# Patient Record
Sex: FEMALE | Race: BLACK OR AFRICAN AMERICAN | Employment: FULL TIME | URBAN - METROPOLITAN AREA
[De-identification: names, ages, dates, MRNs, and addresses within clinical notes are randomized per-mention and may not be internally consistent; named-entity substitution may affect disease eponyms.]

---

## 2023-01-18 ENCOUNTER — APPOINTMENT (OUTPATIENT)
Dept: GENERAL RADIOLOGY | Age: 28
End: 2023-01-18
Payer: MEDICAID

## 2023-01-18 ENCOUNTER — HOSPITAL ENCOUNTER (EMERGENCY)
Age: 28
Discharge: HOME | End: 2023-01-18
Attending: EMERGENCY MEDICINE
Payer: MEDICAID

## 2023-01-18 VITALS
TEMPERATURE: 98.9 F | WEIGHT: 206.79 LBS | HEIGHT: 61 IN | HEART RATE: 84 BPM | SYSTOLIC BLOOD PRESSURE: 111 MMHG | DIASTOLIC BLOOD PRESSURE: 80 MMHG | RESPIRATION RATE: 16 BRPM | BODY MASS INDEX: 39.04 KG/M2 | OXYGEN SATURATION: 100 %

## 2023-01-18 DIAGNOSIS — R07.89 CHEST WALL PAIN: Primary | ICD-10-CM

## 2023-01-18 PROCEDURE — 93005 ELECTROCARDIOGRAM TRACING: CPT | Performed by: EMERGENCY MEDICINE

## 2023-01-18 PROCEDURE — 71046 X-RAY EXAM CHEST 2 VIEWS: CPT

## 2023-01-18 PROCEDURE — 6370000000 HC RX 637 (ALT 250 FOR IP): Performed by: EMERGENCY MEDICINE

## 2023-01-18 PROCEDURE — 99284 EMERGENCY DEPT VISIT MOD MDM: CPT

## 2023-01-18 RX ORDER — CYCLOBENZAPRINE HCL 10 MG
10 TABLET ORAL ONCE
Status: COMPLETED | OUTPATIENT
Start: 2023-01-18 | End: 2023-01-18

## 2023-01-18 RX ORDER — KETOROLAC TROMETHAMINE 15 MG/ML
15 INJECTION, SOLUTION INTRAMUSCULAR; INTRAVENOUS ONCE
Status: DISCONTINUED | OUTPATIENT
Start: 2023-01-18 | End: 2023-01-19 | Stop reason: HOSPADM

## 2023-01-18 RX ORDER — NAPROXEN 500 MG/1
500 TABLET ORAL 2 TIMES DAILY WITH MEALS
Qty: 60 TABLET | Refills: 5 | Status: SHIPPED | OUTPATIENT
Start: 2023-01-18

## 2023-01-18 RX ORDER — TIZANIDINE 4 MG/1
4 TABLET ORAL EVERY 6 HOURS PRN
Qty: 20 TABLET | Refills: 0 | Status: SHIPPED | OUTPATIENT
Start: 2023-01-18

## 2023-01-18 RX ADMIN — CYCLOBENZAPRINE 10 MG: 10 TABLET, FILM COATED ORAL at 20:35

## 2023-01-18 ASSESSMENT — PAIN SCALES - GENERAL: PAINLEVEL_OUTOF10: 8

## 2023-01-19 LAB
EKG ATRIAL RATE: 84 BPM
EKG DIAGNOSIS: NORMAL
EKG P AXIS: 47 DEGREES
EKG P-R INTERVAL: 150 MS
EKG Q-T INTERVAL: 368 MS
EKG QRS DURATION: 82 MS
EKG QTC CALCULATION (BAZETT): 434 MS
EKG R AXIS: 32 DEGREES
EKG T AXIS: 37 DEGREES
EKG VENTRICULAR RATE: 84 BPM

## 2023-01-19 NOTE — ED NOTES
Patient declines toradol at this time, wants to try PO flexeril first. If flexeril doesn't work would like the Dejon Smith RN  01/18/23 2037       Ingrid Head RN  01/18/23 8047

## 2023-01-20 NOTE — ED PROVIDER NOTES
[facility] ENCOUNTER        Pt Name: Francesca Duran  MRN: [id number]  [place] 1995  Date of evaluation: 1/18/2023  Provider: Stefano Garrido MD  PCP: No primary care provider on file. Note Started: 4:29 AM EST 1/20/23    CHIEF COMPLAINT       Chief Complaint   Patient presents with    Nasal Congestion     X2days, with chest congestion sob starting today. HISTORY OF PRESENT ILLNESS: 1 or more Elements   History From: Patient        Francesca Duran is a 33 y.o. female who presents for evaluation of left-sided chest wall pain. Patient reports that 2-day history of symptoms. Reports having severity congestion. Denies difficulties breathing or fevers. She does not take medications for symptoms. Reports that pain is worse with positioning and movement. States it is worse when she sits and improves and she stands. Denies past 1 to lower extremities. Denies recent time spent immobilized. Denies a history of PE or DVT. Denies a history of cardiopulmonary disease. Nursing Notes were all reviewed and agreed with or any disagreements were addressed in the HPI. REVIEW OF SYSTEMS :      Review of Systems    Positives and Pertinent negatives as per HPI. SURGICAL HISTORY   History reviewed. No pertinent surgical history. [address]       Discharge Medication List as of 1/18/2023  9:50 PM        CONTINUE these medications which have NOT CHANGED    Details   miconazole (MONISTAT 1 COMBINATION PACK) 200 & 2 MG-% (9GM) KIT kit Place vaginally nightly, Vaginal, NIGHTLY Starting Wed 1/3/2018, Disp-7 each, R-0, Print             ALLERGIES     Latex    FAMILYHISTORY     History reviewed. No pertinent family history.      SOCIAL HISTORY       Social History     Tobacco Use    Smoking status: Every Day     Packs/day: 1.00     Types: Cigars, Cigarettes     Start date: 3/30/2010   Substance Use Topics    Alcohol use: No     Alcohol/week: 0.0 standard drinks    Drug use: No       SCREENINGS        Pierson Coma Scale  Eye Opening: Spontaneous  Best Verbal Response: Oriented  Best Motor Response: Obeys commands  Carl Coma Scale Score: 15                CIWA Assessment  BP: 111/80  Heart Rate: 84           PHYSICAL EXAM  1 or more Elements     ED Triage Vitals [01/18/23 2011]   BP Temp Temp Source Heart Rate Resp SpO2 Height Weight   111/80 98.9 °F (37.2 °C) Oral 97 16 98 % 5' 1\" (1.549 m) 206 lb 12.7 oz (93.8 kg)       Physical Exam  HENT:      Mouth/Throat:      Mouth: Mucous membranes are moist.      Pharynx: Oropharynx is clear. No oropharyngeal exudate. Cardiovascular:      Rate and Rhythm: Normal rate. Pulmonary:      Breath sounds: No stridor. No wheezing or rhonchi. Abdominal:      Tenderness: There is no abdominal tenderness. There is no guarding. Skin:     General: Skin is warm. Capillary Refill: Capillary refill takes less than 2 seconds. DIAGNOSTIC RESULTS   LABS:    Labs Reviewed - No data to display    When ordered only abnormal lab results are displayed. All other labs were within normal range or not returned as of this dictation. EKG: EKG emergency sinus rhythm ventricular rate of 84 bpm.  WV interval and QTc interval within normal limits. Patient is normal axis. There are no significant ST elevations or depressions EKG is nondiagnostic for ACS as interpreted by me. .  Previous EKG to compare to    RADIOLOGY:   Non-plain film images such as CT, Ultrasound and MRI are read by the radiologist. Plain radiographic images are visualized and preliminarily interpreted by the ED Provider with the below findings:    Do not appreciate any acute cardiopulmonary disease. No obvious abnormality osseous structures as interpreted by me. Interpretation per the Radiologist below, if available at the time of this note:    Discussed with Radiologist:     XR CHEST (2 VW)   Final Result   No acute abnormality identified. XR CHEST (2 VW)    Result Date: 1/18/2023  EXAMINATION: TWO XRAY VIEWS OF THE CHEST 1/18/2023 5:49 pm COMPARISON: 01/03/2018 HISTORY: ORDERING SYSTEM PROVIDED HISTORY: chest wall pain TECHNOLOGIST PROVIDED HISTORY: Reason for exam:->chest wall pain Reason for Exam: Chest wall pain Additional signs and symptoms: Nasal congestion - X2days, with chest congestion sob starting today. Relevant Medical/Surgical History: Current every day smoker. Hx of obesity. No hx of any other relevant medical issues or of any relevant surgeries. FINDINGS: The cardial-pericardial silhouette is unremarkable in appearance. The lungs are clear. No pneumothorax is found. No free air is seen. No acute bony abnormality. No acute abnormality identified. No results found. PROCEDURES   Unless otherwise noted below, none     Procedures    CRITICAL CARE TIME (.cct)       PAST MEDICAL HISTORY      has no past medical history on file. EMERGENCY DEPARTMENT COURSE and DIFFERENTIAL DIAGNOSIS/MDM:   Vitals:    Vitals:    01/18/23 2011 01/18/23 2208   BP: 111/80    Pulse: 97 84   Resp: 16 16   Temp: 98.9 °F (37.2 °C)    TempSrc: Oral    SpO2: 98% 100%   Weight: 206 lb 12.7 oz (93.8 kg)    Height: 5' 1\" (1.549 m)        Patient was given the following medications:  Medications   cyclobenzaprine (FLEXERIL) tablet 10 mg (10 mg Oral Given 1/18/23 2035)             Is this patient to be included in the SEP-1 Core Measure due to severe sepsis or septic shock? No   Exclusion criteria - the patient is NOT to be included for SEP-1 Core Measure due to:  2+ SIRS criteria are not met    CC/HPI Summary, DDx, ED Course, and Reassessment: Patient resents ED for valuation of chest pain. It is positional in nature and patient reports its worse when tries to raise her arms or sit down flat. Lungs consultation vital signs within normal limits. Patient low risk for CAD based on age and comorbidities.   Low risk for PE based on PERC and Wells criteria. CONSULTS: (Who and What was discussed)  None  \        Chronic Conditions: History reviewed. No pertinent past medical history. Records Reviewed (source and summary):    Disposition Considerations (include 1 Tests not done, Admit vs D/C, Shared Decision Making, Pt Expectation of Test or Tx.): Considered obtaining additional laboratory work-up of the patient is low risk for CAD or ACS. Based on history and presentation strongly suspect a musculoskeletal etiology of patient's symptoms. X-ray without acute Cardiopulmonary disease. Results cussed with patient as well as my suspicion that her symptoms are musculoskeletal in nature. Admission to the hospital considered but patient's symptoms are improving. Vital signs and testing performed is reassuring. Based on this patient is appropriate for outpatient management. No indication for admission at this time. Symptomatic treatment with expectant management discussed with the patient and/or family member or surrogates present and they are amenable to treatment plan and outpatient follow-up. Strict return precautions were discussed with the patient and those present. All parties involved were informed that condition may persist or worsen in which case they may then require inpatient treatment, currently there is no indication. They demonstrated understanding of when to return to the emergency department for new or worsening symptoms. I am the Primary Clinician of Record. FINAL IMPRESSION      1.  Chest wall pain          DISPOSITION/PLAN     DISPOSITION Decision To Discharge 01/18/2023 09:46:15 PM      PATIENT REFERRED TO:  [facility]) Pre-Services  [phone]          DISCHARGE MEDICATIONS:  Discharge Medication List as of 1/18/2023  9:50 PM        START taking these medications    Details   naproxen (NAPROSYN) 500 MG tablet Take 1 tablet by mouth 2 times daily (with meals), Disp-60 tablet, R-5Print      tiZANidine (ZANAFLEX) 4 MG tablet Take 1 tablet by mouth every 6 hours as needed (muscle aches), Disp-20 tablet, R-0Print             DISCONTINUED MEDICATIONS:  Discharge Medication List as of 1/18/2023  9:50 PM                 (Please note that portions of this note were completed with a voice recognition program.  Efforts were made to edit the dictations but occasionally words are mis-transcribed.)    Rachid Gomez MD (electronically signed)      \      Rachid Gomez MD  01/20/23 2112

## 2024-05-21 ENCOUNTER — HOSPITAL ENCOUNTER (EMERGENCY)
Age: 29
Discharge: HOME | End: 2024-05-21
Payer: MEDICAID

## 2024-05-21 VITALS
BODY MASS INDEX: 35.88 KG/M2 | DIASTOLIC BLOOD PRESSURE: 94 MMHG | RESPIRATION RATE: 16 BRPM | WEIGHT: 195 LBS | HEART RATE: 87 BPM | SYSTOLIC BLOOD PRESSURE: 127 MMHG | OXYGEN SATURATION: 100 % | TEMPERATURE: 98 F | HEIGHT: 62 IN

## 2024-05-21 DIAGNOSIS — Z86.79 HISTORY OF POSTPARTUM HYPERTENSION: ICD-10-CM

## 2024-05-21 DIAGNOSIS — Z87.59 HISTORY OF POSTPARTUM HYPERTENSION: ICD-10-CM

## 2024-05-21 DIAGNOSIS — R03.0 ELEVATED BLOOD-PRESSURE READING WITHOUT DIAGNOSIS OF HYPERTENSION: Primary | ICD-10-CM

## 2024-05-21 LAB
ANION GAP SERPL CALCULATED.3IONS-SCNC: 14 MMOL/L (ref 3–16)
BASOPHILS # BLD: 0.1 K/UL (ref 0–0.2)
BASOPHILS NFR BLD: 1.3 %
BUN SERPL-MCNC: 12 MG/DL (ref 7–20)
CALCIUM SERPL-MCNC: 9.1 MG/DL (ref 8.3–10.6)
CHLORIDE SERPL-SCNC: 107 MMOL/L (ref 99–110)
CO2 SERPL-SCNC: 20 MMOL/L (ref 21–32)
CREAT SERPL-MCNC: 0.7 MG/DL (ref 0.6–1.1)
DEPRECATED RDW RBC AUTO: 23.1 % (ref 12.4–15.4)
EOSINOPHIL # BLD: 0 K/UL (ref 0–0.6)
EOSINOPHIL NFR BLD: 0.5 %
GFR SERPLBLD CREATININE-BSD FMLA CKD-EPI: >90 ML/MIN/{1.73_M2}
GLUCOSE SERPL-MCNC: 135 MG/DL (ref 70–99)
HCT VFR BLD AUTO: 38.3 % (ref 36–48)
HGB BLD-MCNC: 12.1 G/DL (ref 12–16)
LYMPHOCYTES # BLD: 1.7 K/UL (ref 1–5.1)
LYMPHOCYTES NFR BLD: 21.4 %
MCH RBC QN AUTO: 23.4 PG (ref 26–34)
MCHC RBC AUTO-ENTMCNC: 31.6 G/DL (ref 31–36)
MCV RBC AUTO: 74.1 FL (ref 80–100)
MONOCYTES # BLD: 0.6 K/UL (ref 0–1.3)
MONOCYTES NFR BLD: 8 %
NEUTROPHILS # BLD: 5.5 K/UL (ref 1.7–7.7)
NEUTROPHILS NFR BLD: 68.8 %
PLATELET # BLD AUTO: 244 K/UL (ref 135–450)
PMV BLD AUTO: 8.4 FL (ref 5–10.5)
POTASSIUM SERPL-SCNC: 3.4 MMOL/L (ref 3.5–5.1)
RBC # BLD AUTO: 5.17 M/UL (ref 4–5.2)
SODIUM SERPL-SCNC: 141 MMOL/L (ref 136–145)
WBC # BLD AUTO: 7.9 K/UL (ref 4–11)

## 2024-05-21 PROCEDURE — 99284 EMERGENCY DEPT VISIT MOD MDM: CPT

## 2024-05-21 PROCEDURE — 80048 BASIC METABOLIC PNL TOTAL CA: CPT

## 2024-05-21 PROCEDURE — 6370000000 HC RX 637 (ALT 250 FOR IP): Performed by: PHYSICIAN ASSISTANT

## 2024-05-21 PROCEDURE — 85025 COMPLETE CBC W/AUTO DIFF WBC: CPT

## 2024-05-21 PROCEDURE — 36415 COLL VENOUS BLD VENIPUNCTURE: CPT

## 2024-05-21 RX ORDER — IBUPROFEN 600 MG/1
600 TABLET ORAL ONCE
Status: COMPLETED | OUTPATIENT
Start: 2024-05-21 | End: 2024-05-21

## 2024-05-21 RX ORDER — PSEUDOEPHED/ACETAMINOPH/DIPHEN 30MG-500MG
TABLET ORAL
COMMUNITY

## 2024-05-21 RX ORDER — NIFEDIPINE 30 MG/1
30 TABLET, EXTENDED RELEASE ORAL DAILY
COMMUNITY
Start: 2024-04-10

## 2024-05-21 RX ORDER — ACETAMINOPHEN 500 MG
1000 TABLET ORAL ONCE
Status: COMPLETED | OUTPATIENT
Start: 2024-05-21 | End: 2024-05-21

## 2024-05-21 RX ORDER — PNV NO.118/IRON FUMARATE/FA 29 MG-1 MG
1 TABLET,CHEWABLE ORAL DAILY
COMMUNITY

## 2024-05-21 RX ORDER — IBUPROFEN 600 MG/1
600 TABLET ORAL EVERY 6 HOURS
COMMUNITY
Start: 2024-04-07

## 2024-05-21 RX ADMIN — IBUPROFEN 600 MG: 600 TABLET, FILM COATED ORAL at 15:53

## 2024-05-21 RX ADMIN — ACETAMINOPHEN 1000 MG: 500 TABLET ORAL at 15:53

## 2024-05-21 ASSESSMENT — LIFESTYLE VARIABLES
HOW MANY STANDARD DRINKS CONTAINING ALCOHOL DO YOU HAVE ON A TYPICAL DAY: PATIENT DOES NOT DRINK
HOW OFTEN DO YOU HAVE A DRINK CONTAINING ALCOHOL: NEVER

## 2024-05-21 ASSESSMENT — PAIN SCALES - GENERAL: PAINLEVEL_OUTOF10: 6

## 2024-05-21 NOTE — ED NOTES
Discharge instructions with pt.  Encouraged to follow up with OB doctor this week.  No pain.   Encouraged return to ED as needed.

## 2024-05-21 NOTE — DISCHARGE INSTRUCTIONS
Continue home medication as prescribed  Follow-up with your OB/GYN.  Give their office a call schedule to be seen by the end of this week if possible.  Return emergency room for any worsening

## 2024-05-21 NOTE — ED PROVIDER NOTES
**ADVANCED PRACTICE PROVIDER, I HAVE EVALUATED THIS PATIENT**        [facility]  EMERGENCY DEPARTMENT ENCOUNTER      Pt Name: Imtiaz Amezcua  MRN:[id number]  Birthdate 1995  Date of evaluation: 5/21/2024  Provider: Wilfredo Ocampo PA-C  Note Started: 5:45 PM EDT 5/21/24        Chief Complaint:    Chief Complaint   Patient presents with    Hypertension     Post partum hypertension. On procardia. 6 weeks postpartum.          Nursing Notes, Past Medical Hx, Past Surgical Hx, Social Hx, Allergies, and Family Hx were all reviewed and agreed with or any disagreements were addressed in the HPI.    HPI: (Location, Duration, Timing, Severity, Quality, Assoc Sx, Context, Modifying factors)    History From: Patient  Limitations to history : None    Social Determinants Significantly Affecting Health : None    Chief Complaint of elevated blood pressure.  Patient is 7 weeks postpartum.  And says her blood pressure suddenly went up.  She is taking Procardia.  Supposed to be she said as needed when she noticed that her blood pressure is up.  She took it today.  She came in because it did not go down.  She denies chest pain, no abdominal pain, no lightheaded or dizziness.  Did have a slight headache.  No nausea vomiting.  No abdominal pain, no extremity pain or weakness.  She is ambulatory.  Does not appear to be in acute distress.  Normal delivery.    This is a  29 y.o. female who presents to the emergency room with the above complaint.    PastMedical/Surgical History:  History reviewed. No pertinent past medical history.  History reviewed. No pertinent surgical history.    Medications:  Previous Medications    ACETAMINOPHEN EXTRA STRENGTH 500 MG TABS    TAKE 2 TABLETS BY MOUTH EVERY 6 HOURS    IBUPROFEN (ADVIL;MOTRIN) 600 MG TABLET    Take 1 tablet by mouth every 6 hours    NIFEDIPINE (PROCARDIA XL) 30 MG EXTENDED RELEASE TABLET    Take 1 tablet by mouth daily    PRENATAL VIT-FE FUMARATE-FA  considered, Shared Decision Making, Pt Expectation of Test or Tx.):     See discussion above.       The patient tolerated their visit well.  I evaluated the patient.  The physician was available for consultation as needed.  The patient and / or the family were informed of the results of any tests, a time was given to answer questions, a plan was proposed and they agreed with plan.     I am the Primary Clinician of Record.     CLINICAL IMPRESSION:  1. Elevated blood-pressure reading without diagnosis of hypertension    2. History of postpartum hypertension        DISPOSITION Decision To Discharge 05/21/2024 05:45:37 PM      PATIENT REFERRED TO:  [facility] Emergency Department  [address] [address]  [phone]  Call   If symptoms worsen      DISCHARGE MEDICATIONS:  New Prescriptions    No medications on file       DISCONTINUED MEDICATIONS:  Discontinued Medications    MICONAZOLE (MONISTAT 1 COMBINATION PACK) 200 & 2 MG-% (9GM) KIT KIT    Place vaginally nightly    NAPROXEN (NAPROSYN) 500 MG TABLET    Take 1 tablet by mouth 2 times daily (with meals)    TIZANIDINE (ZANAFLEX) 4 MG TABLET    Take 1 tablet by mouth every 6 hours as needed (muscle aches)              (Please note the MDM and HPI sections of this note were completed with a voice recognition program.  Efforts were made to edit the dictations but occasionally words are mis-transcribed.)    Electronically signed, Wilfredo Ocampo PA-C,          Wilfredo Ocampo PA-C  05/25/24 0680

## 2024-05-21 NOTE — ED TRIAGE NOTES
Pt presents to ED with c/o postpartum hypertension. Reports on procardia. Reports worsening headache. States has not called her OBGYN. +HTN noted. Resp even and unlabored. A/ox4. No acute distress noted. Denies any need at this time. Call light within reach. Bed in lowest position. Will continue to monitor.

## 2024-05-21 NOTE — ED NOTES
Resting  comfortably with kids  at bedside.   BP improved.   Waiting for test results.    No HA or pain.

## 2024-05-25 ASSESSMENT — ENCOUNTER SYMPTOMS
COUGH: 0
VOMITING: 0
BACK PAIN: 0
SORE THROAT: 0
EYE PAIN: 0
NAUSEA: 0
SHORTNESS OF BREATH: 0
ABDOMINAL PAIN: 0

## 2024-06-23 ENCOUNTER — OFFICE VISIT (OUTPATIENT)
Age: 29
End: 2024-06-23

## 2024-06-23 VITALS
SYSTOLIC BLOOD PRESSURE: 132 MMHG | OXYGEN SATURATION: 98 % | BODY MASS INDEX: 35.67 KG/M2 | TEMPERATURE: 98.3 F | DIASTOLIC BLOOD PRESSURE: 88 MMHG | HEART RATE: 83 BPM | WEIGHT: 195 LBS

## 2024-06-23 DIAGNOSIS — H00.015 HORDEOLUM EXTERNUM OF LEFT LOWER EYELID: Primary | ICD-10-CM

## 2024-06-27 ENCOUNTER — HOSPITAL ENCOUNTER (EMERGENCY)
Age: 29
Discharge: HOME | End: 2024-06-27
Attending: EMERGENCY MEDICINE
Payer: MEDICAID

## 2024-06-27 VITALS
OXYGEN SATURATION: 100 % | TEMPERATURE: 98.4 F | HEART RATE: 98 BPM | RESPIRATION RATE: 16 BRPM | SYSTOLIC BLOOD PRESSURE: 133 MMHG | DIASTOLIC BLOOD PRESSURE: 81 MMHG

## 2024-06-27 DIAGNOSIS — K02.9 DENTAL DECAY: ICD-10-CM

## 2024-06-27 DIAGNOSIS — K06.8 GINGIVAL BLEEDING: Primary | ICD-10-CM

## 2024-06-27 PROCEDURE — 99283 EMERGENCY DEPT VISIT LOW MDM: CPT

## 2024-06-27 RX ORDER — PENICILLIN V POTASSIUM 500 MG/1
500 TABLET ORAL 4 TIMES DAILY
Qty: 40 TABLET | Refills: 0 | Status: SHIPPED | OUTPATIENT
Start: 2024-06-27 | End: 2024-07-07

## 2024-06-27 NOTE — DISCHARGE INSTRUCTIONS
Drink plenty of fluids.  Follow-up with a primary care physician in 1 to 2 days for reexamination.  Call today for an appointment.  If condition worsens or new symptoms develop, return immediately to the emergency department.     Follow-up with a dentist as soon as possible on Tuesday as scheduled.    Apply pressure with gauze if any bleeding recurs.

## 2024-06-27 NOTE — ED PROVIDER NOTES
[facility]  EMERGENCY DEPARTMENT ENCOUNTER      Pt Name: Imtiaz Amezcua  MRN: [id number]  Birthdate 1995  Date of evaluation: 6/27/2024  Provider: OMAR SCOTT DO    CHIEF COMPLAINT       Chief Complaint   Patient presents with    Dental Problem         HISTORY OF PRESENT ILLNESS   (Location/Symptom, Timing/Onset, Context/Setting, Quality, Duration, Modifying Factors, Severity)  Note limiting factors.   Imtiaz Amezcua is a 29 y.o. female who presents to the emergency department with a complaint of a dental problem.  The patient states that she noticed some bleeding from her right mandibular third molar area earlier today prior to arrival.  She reports that she was passing a couple of small clots and spitting up blood.  She does not take any anticoagulants.  She does not take any NSAIDs.  She does report that the right mandibular third molar broke off \"a while back\".    She has an appointment to see her dentist on Tuesday, 5 days from now.    She denies any fever chills trauma or injury.  She denies any swelling of the face or neck.  She denies any nausea vomiting or diarrhea.    She is currently breast-feeding.  She is 3 months postpartum.    No current antibiotics.  She has been taken Tylenol or ibuprofen as directed for pain    Nursing Notes were reviewed.    HPI        REVIEW OF SYSTEMS    (2-9 systems for level 4, 10 or more for level 5)       Constitutional: Negative for fever or chills.     HENT: Negative for rhinorrhea and sore throat.    Eyes: Negative for redness or drainage.     Respiratory: Negative for shortness of breath or dyspnea on exertion.     Cardiovascular: Negative for chest pain.   Gastrointestinal: Negative for abdominal pain.  Negative for vomiting or diarrhea.   Neurological: Negative for headache.    Genitourinary: Negative for flank pain.  Negative for dysuria.  Negative for hematuria.           All systems are reviewed and are negative except for

## 2024-06-27 NOTE — ED NOTES
Patient DCed from ED at this time. Discussed AVS, follow up, and scripts. They verbalized understanding. Reinforced that should symptoms persist or worsen to return to the ED. They verbalized understanding. Patient ambulated out of ED. RN thanked patient for choosing [facility].

## 2024-08-09 ENCOUNTER — HOSPITAL ENCOUNTER (EMERGENCY)
Age: 29
Discharge: HOME | End: 2024-08-09
Attending: EMERGENCY MEDICINE
Payer: MEDICAID

## 2024-08-09 VITALS
SYSTOLIC BLOOD PRESSURE: 134 MMHG | TEMPERATURE: 98.2 F | HEART RATE: 81 BPM | WEIGHT: 181.22 LBS | OXYGEN SATURATION: 99 % | RESPIRATION RATE: 16 BRPM | BODY MASS INDEX: 33.15 KG/M2 | DIASTOLIC BLOOD PRESSURE: 71 MMHG

## 2024-08-09 DIAGNOSIS — J02.9 ACUTE PHARYNGITIS, UNSPECIFIED ETIOLOGY: Primary | ICD-10-CM

## 2024-08-09 LAB
S PYO AG THROAT QL: NEGATIVE
SARS-COV-2 RDRP RESP QL NAA+PROBE: NOT DETECTED

## 2024-08-09 PROCEDURE — 87081 CULTURE SCREEN ONLY: CPT

## 2024-08-09 PROCEDURE — 99283 EMERGENCY DEPT VISIT LOW MDM: CPT

## 2024-08-09 PROCEDURE — 87880 STREP A ASSAY W/OPTIC: CPT

## 2024-08-09 PROCEDURE — 6370000000 HC RX 637 (ALT 250 FOR IP): Performed by: EMERGENCY MEDICINE

## 2024-08-09 PROCEDURE — 87635 SARS-COV-2 COVID-19 AMP PRB: CPT

## 2024-08-09 RX ORDER — ACETAMINOPHEN 325 MG/1
650 TABLET ORAL ONCE
Status: COMPLETED | OUTPATIENT
Start: 2024-08-09 | End: 2024-08-09

## 2024-08-09 RX ORDER — LIDOCAINE HYDROCHLORIDE 20 MG/ML
5 SOLUTION OROPHARYNGEAL ONCE
Status: DISCONTINUED | OUTPATIENT
Start: 2024-08-09 | End: 2024-08-09 | Stop reason: HOSPADM

## 2024-08-09 RX ADMIN — ACETAMINOPHEN 650 MG: 325 TABLET ORAL at 01:17

## 2024-08-09 ASSESSMENT — PAIN DESCRIPTION - LOCATION: LOCATION: THROAT

## 2024-08-09 ASSESSMENT — PAIN - FUNCTIONAL ASSESSMENT: PAIN_FUNCTIONAL_ASSESSMENT: NONE - DENIES PAIN

## 2024-08-09 ASSESSMENT — PAIN SCALES - GENERAL: PAINLEVEL_OUTOF10: 7

## 2024-08-10 NOTE — ED PROVIDER NOTES
[facility]  EMERGENCY DEPARTMENT ENCOUNTER        Pt Name: Imtiaz Amezcua  MRN: [id number]  Birthdate 1995  Date of evaluation: 8/9/2024  Provider: Bryon Valencia MD  PCP: Monica Rodriguez APRN - CNP  Note Started: 4:31 AM EDT 8/10/24    CHIEF COMPLAINT       Chief Complaint   Patient presents with    Pharyngitis    Anxiety       HISTORY OF PRESENT ILLNESS: 1 or more Elements   History From: Patient        Imtiaz Amezcua is a 29 y.o. female who presents for evaluation of sore throat.  Patient reports that she woke up feeling a tightness in her throat.  She denies fevers or difficulty swallowing.  Denies shortness of breath or wheezing.  She reports that he was feeling anxious and stated that similar symptoms when she thought panic before.  She reports does not feel stressed and denies thoughts of self-harm.  She is not in medications for symptoms.     Nursing Notes were all reviewed and agreed with or any disagreements were addressed in the HPI.    REVIEW OF SYSTEMS :      Review of Systems    Positives and Pertinent negatives as per HPI.     SURGICAL HISTORY   No past surgical history on file.    CURRENTMEDICATIONS       Discharge Medication List as of 8/9/2024  2:12 AM        CONTINUE these medications which have NOT CHANGED    Details   NIFEdipine (PROCARDIA XL) 30 MG extended release tablet Take 1 tablet by mouth dailyHistorical Med      Prenatal Vit-Fe Fumarate-FA (PRENATAL VITAMIN) CHEW Take 1 tablet by mouth dailyHistorical Med      Acetaminophen Extra Strength 500 MG TABS TAKE 2 TABLETS BY MOUTH EVERY 6 HOURSHistorical Med      ibuprofen (ADVIL;MOTRIN) 600 MG tablet Take 1 tablet by mouth every 6 hoursHistorical Med             ALLERGIES     Latex    FAMILYHISTORY     No family history on file.     SOCIAL HISTORY       Social History     Tobacco Use    Smoking status: Former     Current packs/day: 1.00     Average packs/day: 1 pack/day for 14.4 years (14.4 ttl pk-yrs)

## 2024-08-11 LAB — S PYO THROAT QL CULT: NORMAL

## 2024-08-12 LAB — S PYO THROAT QL CULT: NORMAL

## 2024-08-24 ENCOUNTER — APPOINTMENT (OUTPATIENT)
Dept: GENERAL RADIOLOGY | Age: 29
End: 2024-08-24
Payer: MEDICAID

## 2024-08-24 ENCOUNTER — HOSPITAL ENCOUNTER (EMERGENCY)
Age: 29
Discharge: HOME | End: 2024-08-24
Payer: MEDICAID

## 2024-08-24 VITALS
DIASTOLIC BLOOD PRESSURE: 92 MMHG | RESPIRATION RATE: 16 BRPM | BODY MASS INDEX: 31.5 KG/M2 | TEMPERATURE: 98.8 F | SYSTOLIC BLOOD PRESSURE: 131 MMHG | HEART RATE: 73 BPM | WEIGHT: 184.53 LBS | HEIGHT: 64 IN | OXYGEN SATURATION: 99 %

## 2024-08-24 DIAGNOSIS — E83.42 HYPOMAGNESEMIA: ICD-10-CM

## 2024-08-24 DIAGNOSIS — R07.9 CHEST PAIN, UNSPECIFIED TYPE: Primary | ICD-10-CM

## 2024-08-24 LAB
ALBUMIN SERPL-MCNC: 4.2 G/DL (ref 3.4–5)
ALBUMIN/GLOB SERPL: 1.8 {RATIO} (ref 1.1–2.2)
ALP SERPL-CCNC: 59 U/L (ref 40–129)
ALT SERPL-CCNC: <5 U/L (ref 10–40)
ANION GAP SERPL CALCULATED.3IONS-SCNC: 14 MMOL/L (ref 3–16)
AST SERPL-CCNC: 16 U/L (ref 15–37)
BASOPHILS # BLD: 0 K/UL (ref 0–0.2)
BASOPHILS NFR BLD: 0.7 %
BILIRUB SERPL-MCNC: 0.5 MG/DL (ref 0–1)
BUN SERPL-MCNC: 10 MG/DL (ref 7–20)
CALCIUM SERPL-MCNC: 9.2 MG/DL (ref 8.3–10.6)
CHLORIDE SERPL-SCNC: 107 MMOL/L (ref 99–110)
CO2 SERPL-SCNC: 20 MMOL/L (ref 21–32)
CREAT SERPL-MCNC: 0.7 MG/DL (ref 0.6–1.1)
D-DIMER QUANTITATIVE: <0.27 UG/ML FEU (ref 0–0.6)
DEPRECATED RDW RBC AUTO: 14 % (ref 12.4–15.4)
EOSINOPHIL # BLD: 0 K/UL (ref 0–0.6)
EOSINOPHIL NFR BLD: 0.3 %
GFR SERPLBLD CREATININE-BSD FMLA CKD-EPI: >90 ML/MIN/{1.73_M2}
GLUCOSE SERPL-MCNC: 93 MG/DL (ref 70–99)
HCG SERPL QL: NEGATIVE
HCT VFR BLD AUTO: 38.7 % (ref 36–48)
HGB BLD-MCNC: 13.2 G/DL (ref 12–16)
LYMPHOCYTES # BLD: 2.2 K/UL (ref 1–5.1)
LYMPHOCYTES NFR BLD: 30.5 %
MAGNESIUM SERPL-MCNC: 1.6 MG/DL (ref 1.8–2.4)
MCH RBC QN AUTO: 28.2 PG (ref 26–34)
MCHC RBC AUTO-ENTMCNC: 34.1 G/DL (ref 31–36)
MCV RBC AUTO: 82.8 FL (ref 80–100)
MONOCYTES # BLD: 0.8 K/UL (ref 0–1.3)
MONOCYTES NFR BLD: 11.3 %
NEUTROPHILS # BLD: 4.1 K/UL (ref 1.7–7.7)
NEUTROPHILS NFR BLD: 57.2 %
PLATELET # BLD AUTO: 269 K/UL (ref 135–450)
PMV BLD AUTO: 7.9 FL (ref 5–10.5)
POTASSIUM SERPL-SCNC: 3.5 MMOL/L (ref 3.5–5.1)
PROT SERPL-MCNC: 6.6 G/DL (ref 6.4–8.2)
RBC # BLD AUTO: 4.68 M/UL (ref 4–5.2)
S PYO AG THROAT QL: NEGATIVE
SARS-COV-2 RDRP RESP QL NAA+PROBE: NOT DETECTED
SODIUM SERPL-SCNC: 141 MMOL/L (ref 136–145)
TROPONIN, HIGH SENSITIVITY: <6 NG/L (ref 0–14)
TROPONIN, HIGH SENSITIVITY: <6 NG/L (ref 0–14)
WBC # BLD AUTO: 7.2 K/UL (ref 4–11)

## 2024-08-24 PROCEDURE — 87635 SARS-COV-2 COVID-19 AMP PRB: CPT

## 2024-08-24 PROCEDURE — 99285 EMERGENCY DEPT VISIT HI MDM: CPT

## 2024-08-24 PROCEDURE — 93005 ELECTROCARDIOGRAM TRACING: CPT | Performed by: NURSE PRACTITIONER

## 2024-08-24 PROCEDURE — 84703 CHORIONIC GONADOTROPIN ASSAY: CPT

## 2024-08-24 PROCEDURE — 84484 ASSAY OF TROPONIN QUANT: CPT

## 2024-08-24 PROCEDURE — 36415 COLL VENOUS BLD VENIPUNCTURE: CPT

## 2024-08-24 PROCEDURE — 6370000000 HC RX 637 (ALT 250 FOR IP): Performed by: NURSE PRACTITIONER

## 2024-08-24 PROCEDURE — 83735 ASSAY OF MAGNESIUM: CPT

## 2024-08-24 PROCEDURE — 80053 COMPREHEN METABOLIC PANEL: CPT

## 2024-08-24 PROCEDURE — 71045 X-RAY EXAM CHEST 1 VIEW: CPT

## 2024-08-24 PROCEDURE — 87081 CULTURE SCREEN ONLY: CPT

## 2024-08-24 PROCEDURE — 85025 COMPLETE CBC W/AUTO DIFF WBC: CPT

## 2024-08-24 PROCEDURE — 85379 FIBRIN DEGRADATION QUANT: CPT

## 2024-08-24 PROCEDURE — 87880 STREP A ASSAY W/OPTIC: CPT

## 2024-08-24 RX ORDER — LANOLIN ALCOHOL/MO/W.PET/CERES
400 CREAM (GRAM) TOPICAL DAILY
Status: DISCONTINUED | OUTPATIENT
Start: 2024-08-24 | End: 2024-08-24 | Stop reason: HOSPADM

## 2024-08-24 RX ADMIN — Medication 400 MG: at 16:48

## 2024-08-24 ASSESSMENT — HEART SCORE: ECG: NORMAL

## 2024-08-24 ASSESSMENT — PAIN - FUNCTIONAL ASSESSMENT: PAIN_FUNCTIONAL_ASSESSMENT: NONE - DENIES PAIN

## 2024-08-24 NOTE — ED PROVIDER NOTES
[facility]  EMERGENCY DEPARTMENT ENCOUNTER        Pt Name: Imtiaz Amezcua  MRN: [id number]  Birthdate 1995  Date of evaluation: 8/24/2024  Provider: SHARON Madrigal CNP  PCP: Monica Rodriguez APRN - CNP  Note Started: 3:27 PM EDT 8/24/24      LAURENCE. I have evaluated this patient.        CHIEF COMPLAINT       No chief complaint on file.      HISTORY OF PRESENT ILLNESS: 1 or more Elements     History From: Patient     Limitations to history : None    Social Determinants Significantly Affecting Health : None    Chief Complaint: Shortness of breath     Imtiaz Amezcua is a 29 y.o. female who presents to the Emergency Department today with symptoms of shortness of breath.  Patient states her symptoms began approximately 1 week ago.  Also states she has symptoms of sore throat and occasional cough.  Denies any fevers or chills.  No nausea no vomiting.  No abdominal discomfort.  Denies pregnancy.  No acute concerns.    Nursing Notes were all reviewed and agreed with or any disagreements were addressed in the HPI.    REVIEW OF SYSTEMS :      Review of Systems    Positives and Pertinent negatives as per HPI.     SURGICAL HISTORY   No past surgical history on file.    CURRENTMEDICATIONS       Previous Medications    ACETAMINOPHEN EXTRA STRENGTH 500 MG TABS    TAKE 2 TABLETS BY MOUTH EVERY 6 HOURS    BENZOCAINE-MENTHOL (CEPACOL INSTAMAX) 15-20 MG LOZENGE    Take 1 lozenge by mouth every 2 hours as needed for Sore Throat    IBUPROFEN (ADVIL;MOTRIN) 600 MG TABLET    Take 1 tablet by mouth every 6 hours    NIFEDIPINE (PROCARDIA XL) 30 MG EXTENDED RELEASE TABLET    Take 1 tablet by mouth daily    PRENATAL VIT-FE FUMARATE-FA (PRENATAL VITAMIN) CHEW    Take 1 tablet by mouth daily       ALLERGIES     Latex    FAMILYHISTORY     No family history on file.     SOCIAL HISTORY       Social History     Tobacco Use   • Smoking status: Former     Current packs/day: 1.00     Average packs/day: 1 pack/day  results found.    PROCEDURES   Unless otherwise noted below, none     Procedures    CRITICAL CARE TIME (.cctime)         EMERGENCY DEPARTMENT COURSE and DIFFERENTIAL DIAGNOSIS/MDM:   Vitals:    Vitals:    08/24/24 1509 08/24/24 1700   BP: (!) 159/91 (!) 131/92   Pulse: 73    Resp: 16    Temp: 98.8 °F (37.1 °C)    TempSrc: Oral    SpO2: 99%    Weight: 83.7 kg (184 lb 8.4 oz)    Height: 1.626 m (5' 4\")        Patient was given the following medications:  Medications - No data to display    ED Course as of 08/26/24 0915   Sat Aug 24, 2024   1537 EKG 12 Lead  Normal sinus rate 65.  PA is normal.  QRS durations normal.  QTc is within normal limits.  No acute ST segment or T wave changes are present.  No STEMI. [AH]      ED Course User Index  [AH] Ankur Medrano MD        Is this patient to be included in the SEP-1 Core Measure due to severe sepsis or septic shock?   No   Exclusion criteria - the patient is NOT to be included for SEP-1 Core Measure due to:  2+ SIRS criteria are not met      Chronic Conditions affecting care:    has a past medical history of HTN (hypertension).    CONSULTS: (Who and What was discussed)  None      Records Reviewed (External and Source)     CC/HPI Summary, DDx, ED Course, and Reassessment: Plan for discharge and follow-up with family doctor.  No other acute concerns noted at this time.  Patient's troponin, dimer, EKG, for the laboratory finds are unremarkable.  Magnesium mildly low at 1.6 which was replaced orally with 400 mg of Mag-Ox.  Patient is not pregnant.  Otherwise patient looks well.  COVID testing and strep testing are both negative.  Discharged home good condition.  Heart score of 1.  Encouraged follow-up with cardiology and family doctor.    Disposition Considerations (tests considered but not done, Admit vs D/C, Shared Decision Making, Pt Expectation of Test or Tx.):      The patient tolerated their visit well.  The patient and / or the family were informed of the results of any

## 2024-08-24 NOTE — ED NOTES
Patient took her Saline lock out and left after provider reviewed discharge instructions but before RN could print and review AVS with patient.

## 2024-08-25 LAB
EKG ATRIAL RATE: 65 BPM
EKG DIAGNOSIS: NORMAL
EKG P AXIS: 37 DEGREES
EKG P-R INTERVAL: 132 MS
EKG Q-T INTERVAL: 406 MS
EKG QRS DURATION: 80 MS
EKG QTC CALCULATION (BAZETT): 422 MS
EKG R AXIS: 14 DEGREES
EKG T AXIS: 20 DEGREES
EKG VENTRICULAR RATE: 65 BPM
S PYO THROAT QL CULT: NORMAL

## 2024-08-25 PROCEDURE — 93010 ELECTROCARDIOGRAM REPORT: CPT | Performed by: INTERNAL MEDICINE

## 2024-08-26 LAB — S PYO THROAT QL CULT: NORMAL

## 2024-09-10 ENCOUNTER — OFFICE VISIT (OUTPATIENT)
Age: 29
End: 2024-09-10

## 2024-09-10 VITALS
BODY MASS INDEX: 31.47 KG/M2 | HEART RATE: 97 BPM | TEMPERATURE: 98.6 F | OXYGEN SATURATION: 97 % | WEIGHT: 171 LBS | HEIGHT: 62 IN | SYSTOLIC BLOOD PRESSURE: 137 MMHG | DIASTOLIC BLOOD PRESSURE: 86 MMHG

## 2024-09-10 DIAGNOSIS — N39.0 URINARY TRACT INFECTION WITH HEMATURIA, SITE UNSPECIFIED: Primary | ICD-10-CM

## 2024-09-10 DIAGNOSIS — R31.9 URINARY TRACT INFECTION WITH HEMATURIA, SITE UNSPECIFIED: Primary | ICD-10-CM

## 2024-09-10 LAB
APPEARANCE FLUID: ABNORMAL
BILIRUBIN, POC: NEGATIVE
BLOOD URINE, POC: ABNORMAL
CLARITY, POC: ABNORMAL
COLOR, POC: YELLOW
GLUCOSE URINE, POC: NEGATIVE MG/DL
KETONES, POC: NEGATIVE MG/DL
LEUKOCYTE EST, POC: ABNORMAL
NITRITE, POC: POSITIVE
PH, POC: 7
PROTEIN, POC: ABNORMAL MG/DL
SPECIFIC GRAVITY, POC: 1.01
UROBILINOGEN, POC: 1 MG/DL

## 2024-09-10 RX ORDER — NITROFURANTOIN 25; 75 MG/1; MG/1
100 CAPSULE ORAL 2 TIMES DAILY
Qty: 10 CAPSULE | Refills: 0 | Status: SHIPPED | OUTPATIENT
Start: 2024-09-10 | End: 2024-09-15

## 2024-09-10 ASSESSMENT — ENCOUNTER SYMPTOMS
VOMITING: 0
DIARRHEA: 0
ABDOMINAL PAIN: 0
CONSTIPATION: 0
NAUSEA: 0
COUGH: 0
CHEST TIGHTNESS: 0
SHORTNESS OF BREATH: 0

## 2024-09-12 LAB
BACTERIA UR CULT: ABNORMAL
ORGANISM: ABNORMAL

## 2024-10-21 ENCOUNTER — HOSPITAL ENCOUNTER (EMERGENCY)
Age: 29
Discharge: HOME | End: 2024-10-21
Attending: EMERGENCY MEDICINE
Payer: MEDICAID

## 2024-10-21 VITALS
TEMPERATURE: 97.8 F | OXYGEN SATURATION: 98 % | DIASTOLIC BLOOD PRESSURE: 72 MMHG | HEIGHT: 62 IN | HEART RATE: 103 BPM | WEIGHT: 183.42 LBS | SYSTOLIC BLOOD PRESSURE: 110 MMHG | RESPIRATION RATE: 16 BRPM | BODY MASS INDEX: 33.75 KG/M2

## 2024-10-21 DIAGNOSIS — T78.40XA ALLERGIC REACTION, INITIAL ENCOUNTER: Primary | ICD-10-CM

## 2024-10-21 LAB
BILIRUB UR QL STRIP.AUTO: NEGATIVE
CLARITY UR: CLEAR
COLOR UR: YELLOW
GLUCOSE UR STRIP.AUTO-MCNC: NEGATIVE MG/DL
HCG UR QL: NEGATIVE
HGB UR QL STRIP.AUTO: NEGATIVE
KETONES UR STRIP.AUTO-MCNC: NEGATIVE MG/DL
LEUKOCYTE ESTERASE UR QL STRIP.AUTO: NEGATIVE
NITRITE UR QL STRIP.AUTO: NEGATIVE
PH UR STRIP.AUTO: 6 [PH] (ref 5–8)
PROT UR STRIP.AUTO-MCNC: NEGATIVE MG/DL
S PYO AG THROAT QL: NEGATIVE
SARS-COV-2 RDRP RESP QL NAA+PROBE: NOT DETECTED
SP GR UR STRIP.AUTO: 1.01 (ref 1–1.03)
UA COMPLETE W REFLEX CULTURE PNL UR: NORMAL
UA DIPSTICK W REFLEX MICRO PNL UR: NORMAL
URN SPEC COLLECT METH UR: NORMAL
UROBILINOGEN UR STRIP-ACNC: 0.2 E.U./DL

## 2024-10-21 PROCEDURE — 99283 EMERGENCY DEPT VISIT LOW MDM: CPT

## 2024-10-21 PROCEDURE — 81003 URINALYSIS AUTO W/O SCOPE: CPT

## 2024-10-21 PROCEDURE — 87635 SARS-COV-2 COVID-19 AMP PRB: CPT

## 2024-10-21 PROCEDURE — 84703 CHORIONIC GONADOTROPIN ASSAY: CPT

## 2024-10-21 PROCEDURE — 87880 STREP A ASSAY W/OPTIC: CPT

## 2024-10-21 PROCEDURE — 6370000000 HC RX 637 (ALT 250 FOR IP): Performed by: EMERGENCY MEDICINE

## 2024-10-21 RX ORDER — EPINEPHRINE 0.3 MG/.3ML
0.3 INJECTION SUBCUTANEOUS ONCE
Qty: 1 EACH | Refills: 0 | Status: SHIPPED | OUTPATIENT
Start: 2024-10-21 | End: 2024-10-21

## 2024-10-21 RX ORDER — DIPHENHYDRAMINE HCL 25 MG
25 TABLET ORAL ONCE
Status: COMPLETED | OUTPATIENT
Start: 2024-10-21 | End: 2024-10-21

## 2024-10-21 RX ORDER — PREDNISONE 20 MG/1
60 TABLET ORAL ONCE
Status: COMPLETED | OUTPATIENT
Start: 2024-10-21 | End: 2024-10-21

## 2024-10-21 RX ORDER — FAMOTIDINE 20 MG/1
20 TABLET, FILM COATED ORAL 2 TIMES DAILY
Qty: 20 TABLET | Refills: 0 | Status: SHIPPED | OUTPATIENT
Start: 2024-10-21 | End: 2024-10-31

## 2024-10-21 RX ORDER — GRANULES FOR ORAL 3 G/1
3 POWDER ORAL
COMMUNITY
Start: 2024-10-09 | End: 2024-10-23

## 2024-10-21 RX ORDER — FAMOTIDINE 20 MG/1
20 TABLET, FILM COATED ORAL ONCE
Status: COMPLETED | OUTPATIENT
Start: 2024-10-21 | End: 2024-10-21

## 2024-10-21 RX ORDER — PREDNISONE 20 MG/1
40 TABLET ORAL DAILY
Qty: 8 TABLET | Refills: 0 | Status: SHIPPED | OUTPATIENT
Start: 2024-10-21 | End: 2024-10-25

## 2024-10-21 RX ADMIN — DIPHENHYDRAMINE HCL 25 MG: 25 TABLET ORAL at 20:52

## 2024-10-21 RX ADMIN — PREDNISONE 60 MG: 20 TABLET ORAL at 20:52

## 2024-10-21 RX ADMIN — FAMOTIDINE 20 MG: 20 TABLET, FILM COATED ORAL at 20:52

## 2024-10-21 ASSESSMENT — PAIN - FUNCTIONAL ASSESSMENT: PAIN_FUNCTIONAL_ASSESSMENT: NONE - DENIES PAIN

## 2024-10-22 NOTE — ED PROVIDER NOTES
[facility]  EMERGENCY DEPARTMENT ENCOUNTER      Pt Name: Imtiaz Amezcua  MRN: [id number]  Birthdate 1995  Date of evaluation: 10/21/2024  Provider: OAMR SCOTT DO    CHIEF COMPLAINT       Chief Complaint   Patient presents with    Allergic Reaction     C/o tingling and bumps on tongue that started about an hour ago. Does notice being a little short of breath. Denies anything new to daily routine medication, food ect.          HISTORY OF PRESENT ILLNESS   (Location/Symptom, Timing/Onset, Context/Setting, Quality, Duration, Modifying Factors, Severity)  Note limiting factors.   Imtiaz Amezcua is a 29 y.o. female who presents to the emergency department with a complaint of an allergic reaction.  The patient states that she noticed some bumps on the back of her tongue and felt like her throat was itchy and tingling 1 hour prior to arrival.  She denies any previous occurrence.  She denies any generalized erythema, hives, flushing or rash.  She denies any dizziness lightheadedness chest pain or shortness of breath.  She denies any abdominal pain nausea vomiting or diarrhea.  She denies any current dysuria hematuria frequency urgency.    She denies any new food exposures, shellfish, nuts, etc.    She does report prior allergies to Cipro in which she got a rash.  No other previous antibiotic allergies.  She denies any recent changes in soaps, fabric softeners, cosmetics, detergents, pets, etc.    The patient is currently taking metronidazole which she has been taking for several days for bacterial vaginosis and has also been taking fosfomycin for the past 2 weeks for recurrent urinary tract infections prescribed by infectious disease.  She has taken metronidazole in the past without a problem but has never taken fosfomycin before.    She reports that she had a urinary tract infection in September that would not go away necessitating referral to infectious disease.  Urinary

## 2024-10-22 NOTE — ED TRIAGE NOTES
C/o tingling and bumps on tongue that started about an hour ago. Does notice being a little short of breath. Denies anything new to daily routine medication, food ect.

## 2024-10-22 NOTE — DISCHARGE INSTRUCTIONS
Discontinue metronidazole/Flagyl and fosfomycin.  You may be allergic to these medications.    Contact your infectious disease doctor first thing in the morning to notify him of the possible allergic reaction and need for a substitute antibiotic.    Recommend cool showers.      Do not scratch.    Drink plenty of fluids.    May take Benadryl as directed for any itching or hives.  Do not drive or operate machinery while taking Benadryl.  May cause drowsiness.    Follow-up with your primary care physician in 1 to 2 days for reexamination.    Use EpiPen for any serious or life-threatening allergic reactions.  If you have to use the EpiPen, go to the emergency department immediately.    If symptoms persist, recommend referral to an allergist for allergy testing.  Contact your primary care physician for referral.    If condition worsens or new symptoms develop, return immediately to the emergency department.

## 2025-01-12 ENCOUNTER — HOSPITAL ENCOUNTER (EMERGENCY)
Age: 30
Discharge: HOME | End: 2025-01-12
Payer: MEDICAID

## 2025-01-12 ENCOUNTER — APPOINTMENT (OUTPATIENT)
Dept: GENERAL RADIOLOGY | Age: 30
End: 2025-01-12
Payer: MEDICAID

## 2025-01-12 VITALS
WEIGHT: 175.71 LBS | SYSTOLIC BLOOD PRESSURE: 122 MMHG | HEART RATE: 92 BPM | HEIGHT: 62 IN | DIASTOLIC BLOOD PRESSURE: 69 MMHG | OXYGEN SATURATION: 100 % | RESPIRATION RATE: 14 BRPM | TEMPERATURE: 98.3 F | BODY MASS INDEX: 32.33 KG/M2

## 2025-01-12 DIAGNOSIS — S40.012A CONTUSION OF MULTIPLE SITES OF LEFT SHOULDER AND UPPER ARM, INITIAL ENCOUNTER: ICD-10-CM
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
## 2025-01-12 DIAGNOSIS — S40.022A CONTUSION OF MULTIPLE SITES OF LEFT SHOULDER AND UPPER ARM, INITIAL ENCOUNTER: ICD-10-CM

## 2025-01-12 PROCEDURE — 73060 X-RAY EXAM OF HUMERUS: CPT

## 2025-01-12 PROCEDURE — 99283 EMERGENCY DEPT VISIT LOW MDM: CPT

## 2025-01-12 RX ORDER — DOXYLAMINE SUCCINATE AND PYRIDOXINE HYDROCHLORIDE, DELAYED RELEASE TABLETS 10 MG/10 MG 10; 10 MG/1; MG/1
2 TABLET, DELAYED RELEASE ORAL NIGHTLY
COMMUNITY
Start: 2025-01-11

## 2025-01-12 RX ORDER — METOCLOPRAMIDE 5 MG/1
5 TABLET ORAL EVERY 8 HOURS PRN
COMMUNITY
Start: 2025-01-11

## 2025-01-12 ASSESSMENT — PAIN DESCRIPTION - ORIENTATION: ORIENTATION: LEFT

## 2025-01-12 ASSESSMENT — PAIN - FUNCTIONAL ASSESSMENT
PAIN_FUNCTIONAL_ASSESSMENT: 0-10
PAIN_FUNCTIONAL_ASSESSMENT: ACTIVITIES ARE NOT PREVENTED
PAIN_FUNCTIONAL_ASSESSMENT: 0-10

## 2025-01-12 ASSESSMENT — PAIN DESCRIPTION - PAIN TYPE: TYPE: ACUTE PAIN

## 2025-01-12 ASSESSMENT — PAIN DESCRIPTION - DESCRIPTORS: DESCRIPTORS: ACHING;DISCOMFORT

## 2025-01-12 ASSESSMENT — PAIN DESCRIPTION - ONSET: ONSET: SUDDEN

## 2025-01-12 ASSESSMENT — PAIN SCALES - GENERAL
PAINLEVEL_OUTOF10: 8
PAINLEVEL_OUTOF10: 4

## 2025-01-13 NOTE — ED PROVIDER NOTES
else.    This is a  29 y.o. female who presents to the emergency room with the above complaint.    PastMedical/Surgical History:      Diagnosis Date    HTN (hypertension)      History reviewed. No pertinent surgical history.    Medications:  Previous Medications    ACETAMINOPHEN EXTRA STRENGTH 500 MG TABS    TAKE 2 TABLETS BY MOUTH EVERY 6 HOURS    DOXYLAMINE-PYRIDOXINE 10-10 MG TBEC    Take 2 tablets by mouth nightly    EPINEPHRINE (EPIPEN 2-MARGARETTE) 0.3 MG/0.3ML SOAJ INJECTION    Inject 0.3 mLs into the skin once for 1 dose    METOCLOPRAMIDE (REGLAN) 5 MG TABLET    Take 1 tablet by mouth every 8 hours as needed    PRENATAL VIT-FE FUMARATE-FA (PRENATAL VITAMIN) CHEW    Take 1 tablet by mouth daily       Review of Systems:  (1 systems needed)  Review of Systems   Constitutional:  Negative for chills and fever.   HENT:  Negative for congestion and sore throat.    Eyes:  Negative for pain and visual disturbance.   Respiratory:  Negative for cough and shortness of breath.    Cardiovascular:  Negative for chest pain and leg swelling.   Gastrointestinal:  Negative for abdominal pain, nausea and vomiting.   Genitourinary:  Negative for dysuria and frequency.   Musculoskeletal:  Negative for back pain and neck pain.   Skin:  Negative for rash and wound.   Neurological:  Negative for dizziness and light-headedness.       \"Positives and Pertinent negatives as per HPI\"    Physical Exam:  Physical Exam  Vitals and nursing note reviewed.   Constitutional:       General: She is not in acute distress.     Appearance: Normal appearance. She is well-developed. She is not ill-appearing, toxic-appearing or diaphoretic.   HENT:      Head: Normocephalic and atraumatic.      Nose: Nose normal.      Mouth/Throat:      Mouth: Mucous membranes are moist.      Pharynx: Oropharynx is clear. No oropharyngeal exudate or posterior oropharyngeal erythema.   Eyes:      General:         Right eye: No discharge.         Left eye: No discharge.

## 2025-01-13 NOTE — DISCHARGE INSTRUCTIONS
Take OTC Tylenol as needed for pain  Apply ice to all your sore spots.  3-4 times a day 20 to 30 minutes as needed  Follow-up orthopedics if worsens or no improvement  Follow-up with your OB/GYN doctor  Return emergency room for any worsening

## 2025-03-01 ENCOUNTER — OFFICE VISIT (OUTPATIENT)
Age: 30
End: 2025-03-01

## 2025-03-01 VITALS
WEIGHT: 178 LBS | SYSTOLIC BLOOD PRESSURE: 105 MMHG | BODY MASS INDEX: 33.61 KG/M2 | TEMPERATURE: 98.1 F | DIASTOLIC BLOOD PRESSURE: 71 MMHG | HEART RATE: 91 BPM | HEIGHT: 61 IN | OXYGEN SATURATION: 96 %

## 2025-03-01 DIAGNOSIS — N39.0 URINARY TRACT INFECTION WITHOUT HEMATURIA, SITE UNSPECIFIED: Primary | ICD-10-CM

## 2025-03-01 LAB
BILIRUBIN, POC: NORMAL
BLOOD URINE, POC: NORMAL
CLARITY, POC: CLEAR
COLOR, POC: YELLOW
GLUCOSE URINE, POC: NORMAL MG/DL
KETONES, POC: NORMAL MG/DL
LEUKOCYTE EST, POC: NORMAL
NITRITE, POC: NORMAL
PH, POC: 7
PROTEIN, POC: NORMAL MG/DL
SPECIFIC GRAVITY, POC: 1.02
UROBILINOGEN, POC: 0.2 MG/DL

## 2025-03-01 RX ORDER — CEPHALEXIN 500 MG/1
500 CAPSULE ORAL 4 TIMES DAILY
Qty: 28 CAPSULE | Refills: 0 | Status: SHIPPED | OUTPATIENT
Start: 2025-03-01 | End: 2025-03-08

## 2025-03-01 ASSESSMENT — ENCOUNTER SYMPTOMS
DIARRHEA: 0
COUGH: 0
CONSTIPATION: 0
SHORTNESS OF BREATH: 0
NAUSEA: 0
ABDOMINAL PAIN: 0
CHEST TIGHTNESS: 0
VOMITING: 0

## 2025-03-01 NOTE — PATIENT INSTRUCTIONS
Please follow up with OBGYN   We will call with urine culture results and adjust treatment plan as needed.

## 2025-03-01 NOTE — PROGRESS NOTES
Imtiaz Amezcua (:  1995) is a 29 y.o. female,Established patient, here for evaluation of the following chief complaint(s):  Urinary Tract Infection (Frequent urination , feels like she has to urinate still , odor x yesterday )      ASSESSMENT/PLAN:    ICD-10-CM    1. Urinary tract infection without hematuria, site unspecified  N39.0 POCT Urinalysis no Micro     VAGINITIS PANEL PCR     Culture, Urine     cephALEXin (KEFLEX) 500 MG capsule          Patient presents for increased urination and pregnancy.  And odor.  POCT UA does not show signs consistent with UTI.  However given the patient's symptoms we will treat with Keflex.  I will send for urine culture patient understands that we will adjust treatment plan based on culture results.  Vaginitis panel ordered pending  Please follow up with OBGYN   We will call with urine culture results and adjust treatment plan as needed.     SUBJECTIVE/OBJECTIVE:    History provided by:  Patient   used: No      HPI:   29 y.o. female presents with symptoms of Increased urination she states that she has had increased urinary frequency odor ongoing since yesterday.  Patient states that she is about 12 weeks pregnant.  Patient states that she has not taken medications for symptoms.  Denies hematuria admits to dysuria.     Vitals:    25 0954   BP: 105/71   Site: Left Upper Arm   Position: Sitting   Cuff Size: Medium Adult   Pulse: 91   Temp: 98.1 °F (36.7 °C)   TempSrc: Oral   SpO2: 96%   Weight: 80.7 kg (178 lb)   Height: 1.549 m (5' 1\")       Review of Systems   Constitutional:  Negative for chills, diaphoresis, fatigue and fever.   Respiratory:  Negative for cough, chest tightness and shortness of breath.    Cardiovascular:  Negative for chest pain.   Gastrointestinal:  Negative for abdominal pain, constipation, diarrhea, nausea and vomiting.   Musculoskeletal:  Negative for neck pain and neck stiffness.   Skin:  Negative for rash.       Physical

## 2025-03-02 DIAGNOSIS — B96.89 BACTERIAL VAGINOSIS: Primary | ICD-10-CM

## 2025-03-02 DIAGNOSIS — N76.0 BACTERIAL VAGINOSIS: Primary | ICD-10-CM

## 2025-03-02 LAB
BACTERIA UR CULT: NORMAL
BV BACTERIA DNA VAG QL NAA+PROBE: DETECTED
C GLABRATA DNA VAG QL NAA+PROBE: ABNORMAL
C GLABRATA DNA VAG QL NAA+PROBE: NOT DETECTED
C KRUSEI DNA VAG QL NAA+PROBE: NOT DETECTED
CANDIDA DNA VAG QL NAA+PROBE: NOT DETECTED
T VAGINALIS DNA VAG QL NAA+PROBE: NOT DETECTED

## 2025-03-02 RX ORDER — CLINDAMYCIN PHOSPHATE 20 MG/G
CREAM VAGINAL
Qty: 40 G | Refills: 0 | Status: SHIPPED | OUTPATIENT
Start: 2025-03-02 | End: 2025-03-09